# Patient Record
Sex: FEMALE | Race: WHITE | NOT HISPANIC OR LATINO | URBAN - METROPOLITAN AREA
[De-identification: names, ages, dates, MRNs, and addresses within clinical notes are randomized per-mention and may not be internally consistent; named-entity substitution may affect disease eponyms.]

---

## 2020-10-02 ENCOUNTER — OFFICE VISIT (OUTPATIENT)
Dept: URBAN - METROPOLITAN AREA CLINIC 29 | Facility: CLINIC | Age: 35
End: 2020-10-02

## 2020-10-02 PROBLEM — 271832001 FLATULENCE, ERUCTATION AND GAS PAIN: Status: ACTIVE | Noted: 2020-10-02

## 2020-10-02 PROBLEM — 29857009 CHEST PAIN: Status: ACTIVE | Noted: 2020-10-02

## 2020-10-02 PROBLEM — 414916001 OBESITY: Status: ACTIVE | Noted: 2020-10-02

## 2020-11-05 ENCOUNTER — OFFICE VISIT (OUTPATIENT)
Dept: URBAN - METROPOLITAN AREA CLINIC 27 | Facility: CLINIC | Age: 35
End: 2020-11-05

## 2021-01-14 ENCOUNTER — OFFICE VISIT (OUTPATIENT)
Dept: URBAN - METROPOLITAN AREA CLINIC 29 | Facility: CLINIC | Age: 36
End: 2021-01-14

## 2021-02-06 ENCOUNTER — LAB OUTSIDE AN ENCOUNTER (OUTPATIENT)
Dept: URBAN - METROPOLITAN AREA CLINIC 121 | Facility: CLINIC | Age: 36
End: 2021-02-06

## 2021-02-06 LAB
A/G RATIO: (no result)
ALBUMIN: 4.2
ALK PHOS: 68
BG RANDOM: 83
BILI TOTAL: 0.3
BUN/CREAT: (no result)
BUN: 19
CALCIUM: 9.4
CHLORIDE: 107
CO2: 25
CREATININE: 0.97
GLOBULIN TOT: (no result)
POTASSIUM: 4.1
PROTEIN, TOT: 6.8
SGOT (AST): 13
SGPT (ALT): 10
ZZ-GE-UNK: (no result)

## 2022-04-30 ENCOUNTER — TELEPHONE ENCOUNTER (OUTPATIENT)
Dept: URBAN - METROPOLITAN AREA CLINIC 121 | Facility: CLINIC | Age: 37
End: 2022-04-30

## 2022-04-30 RX ORDER — OMEPRAZOLE 20 MG/1
1 CAPSULE PO QAM CAPSULE, DELAYED RELEASE ORAL
OUTPATIENT
Start: 2020-10-02

## 2022-04-30 RX ORDER — OMEPRAZOLE 20 MG/1
1 CAPSULE PO QAM CAPSULE, DELAYED RELEASE ORAL
OUTPATIENT
Start: 2020-10-02 | End: 2020-10-02

## 2022-05-01 ENCOUNTER — TELEPHONE ENCOUNTER (OUTPATIENT)
Dept: URBAN - METROPOLITAN AREA CLINIC 121 | Facility: CLINIC | Age: 37
End: 2022-05-01

## 2022-05-01 RX ORDER — HYOSCYAMINE SULFATE 0.12 MG/1
TAKE 1-2 TABLETS PO Q6H PRN TABLET ORAL
Status: ACTIVE | COMMUNITY
Start: 2021-01-15

## 2022-05-01 RX ORDER — LEVONORGESTREL AND ETHINYL ESTRADIOL
KIT
Status: ACTIVE | COMMUNITY
Start: 2020-10-02

## 2022-05-01 RX ORDER — FLUTICASONE PROPIONATE 50 UG/1
SPRAY, METERED NASAL
Status: ACTIVE | COMMUNITY
Start: 2020-10-02

## 2022-05-01 RX ORDER — PANTOPRAZOLE SODIUM 40 MG/1
TABLET, DELAYED RELEASE ORAL
Status: ACTIVE | COMMUNITY
Start: 2020-10-02

## 2022-05-01 RX ORDER — OMEPRAZOLE 20 MG/1
TAKE 1 CAPSULE BY MOUTH EVERY MORNING CAPSULE, DELAYED RELEASE ORAL
Status: ACTIVE | COMMUNITY
Start: 2021-01-25

## 2022-06-20 ENCOUNTER — ERX REFILL RESPONSE (OUTPATIENT)
Dept: URBAN - METROPOLITAN AREA CLINIC 27 | Facility: CLINIC | Age: 37
End: 2022-06-20

## 2022-06-20 RX ORDER — OMEPRAZOLE 20 MG/1
TAKE 1 CAPSULE BY MOUTH EVERY MORNING CAPSULE, DELAYED RELEASE ORAL
Qty: 90 CAPSULE | Refills: 2 | OUTPATIENT

## 2022-06-20 RX ORDER — OMEPRAZOLE 20 MG/1
TAKE 1 CAPSULE BY MOUTH EVERY MORNING CAPSULE, DELAYED RELEASE ORAL
OUTPATIENT

## 2023-01-17 ENCOUNTER — APPOINTMENT (RX ONLY)
Dept: URBAN - METROPOLITAN AREA CLINIC 45 | Facility: CLINIC | Age: 38
Setting detail: DERMATOLOGY
End: 2023-01-17

## 2023-01-17 DIAGNOSIS — L21.8 OTHER SEBORRHEIC DERMATITIS: ICD-10-CM | Status: INADEQUATELY CONTROLLED

## 2023-01-17 PROCEDURE — ? ADDITIONAL NOTES

## 2023-01-17 PROCEDURE — ? MEDICATION COUNSELING

## 2023-01-17 PROCEDURE — ? PRESCRIPTION MEDICATION MANAGEMENT

## 2023-01-17 PROCEDURE — ? PRESCRIPTION

## 2023-01-17 PROCEDURE — 99204 OFFICE O/P NEW MOD 45 MIN: CPT

## 2023-01-17 PROCEDURE — ? COUNSELING

## 2023-01-17 RX ORDER — KETOCONAZOLE 20 MG/G
CREAM TOPICAL BID
Qty: 30 | Refills: 2 | Status: ERX | COMMUNITY
Start: 2023-01-17

## 2023-01-17 RX ORDER — HYDROCORTISONE 25 MG/G
CREAM TOPICAL BID
Qty: 30 | Refills: 1 | Status: ERX | COMMUNITY
Start: 2023-01-17

## 2023-01-17 RX ORDER — KETOCONAZOLE 20 MG/ML
SHAMPOO, SUSPENSION TOPICAL AS DIRECTED
Qty: 120 | Refills: 3 | Status: ERX | COMMUNITY
Start: 2023-01-17

## 2023-01-17 RX ADMIN — HYDROCORTISONE: 25 CREAM TOPICAL at 00:00

## 2023-01-17 RX ADMIN — KETOCONAZOLE: 20 SHAMPOO, SUSPENSION TOPICAL at 00:00

## 2023-01-17 RX ADMIN — KETOCONAZOLE: 20 CREAM TOPICAL at 00:00

## 2023-01-17 ASSESSMENT — LOCATION DETAILED DESCRIPTION DERM
LOCATION DETAILED: LEFT MEDIAL FRONTAL SCALP
LOCATION DETAILED: LEFT INFERIOR LATERAL NECK
LOCATION DETAILED: RIGHT INFERIOR MEDIAL MALAR CHEEK
LOCATION DETAILED: RIGHT SUPERIOR PARIETAL SCALP
LOCATION DETAILED: LEFT INFERIOR MEDIAL MALAR CHEEK
LOCATION DETAILED: RIGHT INFERIOR ANTERIOR NECK

## 2023-01-17 ASSESSMENT — LOCATION SIMPLE DESCRIPTION DERM
LOCATION SIMPLE: LEFT CHEEK
LOCATION SIMPLE: LEFT ANTERIOR NECK
LOCATION SIMPLE: SCALP
LOCATION SIMPLE: RIGHT ANTERIOR NECK
LOCATION SIMPLE: RIGHT CHEEK
LOCATION SIMPLE: LEFT SCALP

## 2023-01-17 ASSESSMENT — LOCATION ZONE DERM
LOCATION ZONE: NECK
LOCATION ZONE: FACE
LOCATION ZONE: SCALP

## 2023-01-17 NOTE — HPI: RASH
What Type Of Note Output Would You Prefer (Optional)?: Bullet Format
How Severe Is Your Rash?: mild
Is This A New Presentation, Or A Follow-Up?: Rash
Additional History: Patient states the Benadryl made the rash worse and very itchy .\\nNP schedule with SHO GUTIERREZ

## 2023-01-17 NOTE — PROCEDURE: MEDICATION COUNSELING
Called about EGD scheduled 10/7/20 at 0815 and covid-19 test 10/5/20 at 0830 at [facility].  Left voicemail with call back number.  Instructions mailed.   Cibinqo Counseling: I discussed with the patient the risks of Cibinqo therapy including but not limited to common cold, nausea, headache, cold sores, increased blood CPK levels, dizziness, UTIs, fatigue, acne, and vomitting. Live vaccines should be avoided.  This medication has been linked to serious infections; higher rate of mortality; malignancy and lymphoproliferative disorders; major adverse cardiovascular events; thrombosis; thrombocytopenia and lymphopenia; lipid elevations; and retinal detachment.

## 2023-01-17 NOTE — PROCEDURE: PRESCRIPTION MEDICATION MANAGEMENT
Render In Strict Bullet Format?: No
Initiate Treatment: ketoconazole 2 % topical cream Bid\\nQuantity: 30.0 g  Days Supply: 30\\nSig: Apply to aa of face and neck Bid x 2 weeks then prn\\n\\nhydrocortisone 2.5 % topical cream Bid\\nQuantity: 30.0 g  Days Supply: 30\\nSig: Apply to aa of rash on face and neck twice daily x 2 weeks then as needed for flares.\\n\\nketoconazole 2 % shampoo As directed\\nQuantity: 120.0 ml  Days Supply: 30\\nSig: Use Shampoo2-3x weekly to scalp , let shampoo sit then rinse after 5-10 minutes
Detail Level: Zone

## 2023-01-17 NOTE — PROCEDURE: MEDICATION COUNSELING
Xelmiz Pregnancy And Lactation Text: This medication is Pregnancy Category D and is not considered safe during pregnancy.  The risk during breast feeding is also uncertain.

## 2023-05-01 ENCOUNTER — APPOINTMENT (RX ONLY)
Dept: URBAN - METROPOLITAN AREA CLINIC 45 | Facility: CLINIC | Age: 38
Setting detail: DERMATOLOGY
End: 2023-05-01

## 2023-05-01 DIAGNOSIS — L21.8 OTHER SEBORRHEIC DERMATITIS: ICD-10-CM | Status: INADEQUATELY CONTROLLED

## 2023-05-01 PROCEDURE — ? PRESCRIPTION MEDICATION MANAGEMENT

## 2023-05-01 PROCEDURE — ? COUNSELING

## 2023-05-01 PROCEDURE — ? MEDICATION COUNSELING

## 2023-05-01 PROCEDURE — 99214 OFFICE O/P EST MOD 30 MIN: CPT

## 2023-05-01 PROCEDURE — ? ADDITIONAL NOTES

## 2023-05-01 PROCEDURE — ? PRESCRIPTION

## 2023-05-01 RX ORDER — KETOCONAZOLE 20 MG/G
CREAM TOPICAL BID
Qty: 30 | Refills: 2 | Status: ERX

## 2023-05-01 RX ORDER — BETAMETHASONE DIPROPIONATE 0.5 MG/ML
LOTION, AUGMENTED TOPICAL
Qty: 60 | Refills: 3 | Status: ERX | COMMUNITY
Start: 2023-05-01

## 2023-05-01 RX ORDER — KETOCONAZOLE 20 MG/ML
SHAMPOO, SUSPENSION TOPICAL AS DIRECTED
Qty: 120 | Refills: 3 | Status: ERX

## 2023-05-01 RX ORDER — FLUCONAZOLE 150 MG/1
TABLET ORAL
Qty: 6 | Refills: 0 | Status: ERX | COMMUNITY
Start: 2023-05-01

## 2023-05-01 RX ORDER — DOXYCYCLINE HYCLATE 100 MG/1
TABLET, COATED ORAL
Qty: 20 | Refills: 0 | Status: ERX | COMMUNITY
Start: 2023-05-01

## 2023-05-01 RX ORDER — HYDROCORTISONE 25 MG/G
CREAM TOPICAL BID
Qty: 30 | Refills: 1 | Status: ERX

## 2023-05-01 RX ADMIN — FLUCONAZOLE: 150 TABLET ORAL at 00:00

## 2023-05-01 RX ADMIN — BETAMETHASONE DIPROPIONATE: 0.5 LOTION, AUGMENTED TOPICAL at 00:00

## 2023-05-01 RX ADMIN — DOXYCYCLINE HYCLATE: 100 TABLET, COATED ORAL at 00:00

## 2023-05-01 ASSESSMENT — LOCATION ZONE DERM
LOCATION ZONE: FACE
LOCATION ZONE: NECK
LOCATION ZONE: SCALP

## 2023-05-01 ASSESSMENT — LOCATION DETAILED DESCRIPTION DERM
LOCATION DETAILED: RIGHT INFERIOR MEDIAL MALAR CHEEK
LOCATION DETAILED: LEFT MEDIAL FRONTAL SCALP
LOCATION DETAILED: RIGHT SUPERIOR PARIETAL SCALP
LOCATION DETAILED: LEFT INFERIOR MEDIAL MALAR CHEEK
LOCATION DETAILED: RIGHT INFERIOR ANTERIOR NECK
LOCATION DETAILED: LEFT INFERIOR LATERAL NECK

## 2023-05-01 ASSESSMENT — LOCATION SIMPLE DESCRIPTION DERM
LOCATION SIMPLE: RIGHT CHEEK
LOCATION SIMPLE: RIGHT ANTERIOR NECK
LOCATION SIMPLE: LEFT ANTERIOR NECK
LOCATION SIMPLE: SCALP
LOCATION SIMPLE: LEFT CHEEK
LOCATION SIMPLE: LEFT SCALP

## 2023-05-01 NOTE — PROCEDURE: PRESCRIPTION MEDICATION MANAGEMENT
Render In Strict Bullet Format?: No
Initiate Treatment: fluconazole 150 mg tablet \\nQuantity: 6.0 Tablet\\nSig: Take 2 tablets only a week X 3 weeks\\n\\ndoxycycline hyclate 100 mg tablet \\nQuantity: 20.0 Tablet\\nSig: Take BID x 10 days , take with food\\n\\nbetamethasone, augmented 0.05 % lotion \\nQuantity: 60.0 ml  Days Supply: 30\\nSig: Apply a few drops to aa of the scalp Qhs  as needed
Detail Level: Zone
Continue Regimen: ketoconazole 2 % topical cream Bid\\nQuantity: 30.0 g  Days Supply: 30\\nSig: Apply to aa of face and neck Bid x 2 weeks then prn\\n\\nhydrocortisone 2.5 % topical cream Bid\\nQuantity: 30.0 g  Days Supply: 30\\nSig: Apply to aa of rash on face and neck twice daily x 2 weeks then as needed for flares.\\n\\nketoconazole 2 % shampoo As directed\\nQuantity: 120.0 ml  Days Supply: 30\\nSig: Use Shampoo2-3x weekly to scalp , let shampoo sit then rinse after 5-10 minutes.

## 2023-05-01 NOTE — PROCEDURE: MEDICATION COUNSELING
Patient wants to know if this is going to be a dermatologic issue - because she has one and can see them if you would prefer. But if it is a lipoma she would see them she states would rather get ahead and see rather do the ultrasound.  But will do the ultrasound if you dont think its a lipoma (states they can take care of it in the office usually) Olanzapine Pregnancy And Lactation Text: This medication is pregnancy category C.   There are no adequate and well controlled trials with olanzapine in pregnant females.  Olanzapine should be used during pregnancy only if the potential benefit justifies the potential risk to the fetus.   In a study in lactating healthy women, olanzapine was excreted in breast milk.  It is recommended that women taking olanzapine should not breast feed.

## 2024-09-11 NOTE — PROCEDURE: MEDICATION COUNSELING
Admitted Olanzapine Counseling- I discussed with the patient the common side effects of olanzapine including but are not limited to: lack of energy, dry mouth, increased appetite, sleepiness, tremor, constipation, dizziness, changes in behavior, or restlessness.  Explained that teenagers are more likely to experience headaches, abdominal pain, pain in the arms or legs, tiredness, and sleepiness.  Serious side effects include but are not limited: increased risk of death in elderly patients who are confused, have memory loss, or dementia-related psychosis; hyperglycemia; increased cholesterol and triglycerides; and weight gain.